# Patient Record
Sex: FEMALE | URBAN - METROPOLITAN AREA
[De-identification: names, ages, dates, MRNs, and addresses within clinical notes are randomized per-mention and may not be internally consistent; named-entity substitution may affect disease eponyms.]

---

## 2019-07-19 ENCOUNTER — OFFICE VISIT (OUTPATIENT)
Dept: URBAN - METROPOLITAN AREA CLINIC 11 | Facility: CLINIC | Age: 49
End: 2019-07-19
Payer: COMMERCIAL

## 2019-07-19 DIAGNOSIS — H52.223 REGULAR ASTIGMATISM, BILATERAL: Primary | ICD-10-CM

## 2019-07-19 DIAGNOSIS — H11.011 AMYLOID PTERYGIUM OF RIGHT EYE: ICD-10-CM

## 2019-07-19 PROCEDURE — 92004 COMPRE OPH EXAM NEW PT 1/>: CPT | Performed by: OPTOMETRIST

## 2019-07-19 ASSESSMENT — VISUAL ACUITY
OD: 20/20
OS: 20/20

## 2019-07-19 ASSESSMENT — INTRAOCULAR PRESSURE
OS: 15
OD: 15

## 2019-07-19 NOTE — IMPRESSION/PLAN
Impression: Amyloid pterygium of right eye: H11.011. OD. Plan: Pt ed re: condition, no visual impact at this time. Use AT's prn x lubrication, and sunglasses for UV protection. Monitor yearly.

## 2019-08-19 ENCOUNTER — OFFICE VISIT (OUTPATIENT)
Dept: URBAN - METROPOLITAN AREA CLINIC 11 | Facility: CLINIC | Age: 49
End: 2019-08-19
Payer: COMMERCIAL

## 2019-08-19 PROCEDURE — 92012 INTRM OPH EXAM EST PATIENT: CPT | Performed by: OPTOMETRIST

## 2019-08-19 RX ORDER — OLOPATADINE HYDROCHLORIDE 7 MG/ML
0.7 % SOLUTION OPHTHALMIC
Qty: 1 | Refills: 9 | Status: INACTIVE
Start: 2019-08-19 | End: 2019-08-26

## 2019-08-19 ASSESSMENT — INTRAOCULAR PRESSURE
OD: 19
OS: 19

## 2019-08-19 NOTE — IMPRESSION/PLAN
Impression: Other chronic allergic conjunctivitis: H10.45. Plan: Sample Lotemax SM 1gt tid ou. After finish with Lotemax sample, start Pazeo qd ou.

## 2019-09-04 ENCOUNTER — OFFICE VISIT (OUTPATIENT)
Dept: URBAN - METROPOLITAN AREA CLINIC 11 | Facility: CLINIC | Age: 49
End: 2019-09-04
Payer: COMMERCIAL

## 2019-09-04 DIAGNOSIS — H10.45 OTHER CHRONIC ALLERGIC CONJUNCTIVITIS: Primary | ICD-10-CM

## 2019-09-04 PROCEDURE — 99213 OFFICE O/P EST LOW 20 MIN: CPT | Performed by: OPTOMETRIST

## 2019-09-04 ASSESSMENT — INTRAOCULAR PRESSURE
OS: 17
OD: 17

## 2019-09-04 NOTE — IMPRESSION/PLAN
Impression: Other chronic allergic conjunctivitis: H10.45. Condition: resolved. Plan: continue zaditor 1-2x/day ou for itch. Recommend art tears tid ou.

## 2023-01-23 NOTE — IMPRESSION/PLAN
Impression: Keratoconjunctivitis sicca, bilateral: A99.883. Plan: Dry eyes account for the patient's complaints. There is no evidence of permanent changes to the cornea. Explained condition does not have a cure and will need artificial tears for maintenance. Patient instructed to use artificial tears QID. Patient to call back if symptoms do not improve in 4-6weeks or symptoms worsen to discuss further intervention.

## 2023-01-23 NOTE — IMPRESSION/PLAN
Impression: Amyloid pterygium of right eye: H11.011. Plan: Patient advised of all ocular findings. Discussed the importance of frequent lubrication and use of UV protection when outside or in the car. Discussed treatment options such as surgical intervention vs monitoring. Patient defers surgical intervention at this time. Monitor.

## 2023-01-23 NOTE — IMPRESSION/PLAN
Impression: Type 2 diabetes mellitus w/o complication: J91.3. Plan: Diabetes type II: no background retinopathy, no signs of neovascularization noted. Discussed ocular and systemic benefits of blood sugar control.